# Patient Record
Sex: FEMALE | Race: WHITE | Employment: UNEMPLOYED | URBAN - METROPOLITAN AREA
[De-identification: names, ages, dates, MRNs, and addresses within clinical notes are randomized per-mention and may not be internally consistent; named-entity substitution may affect disease eponyms.]

---

## 2017-01-06 ENCOUNTER — HOSPITAL ENCOUNTER (OUTPATIENT)
Dept: PHYSICAL THERAPY | Age: 52
End: 2017-01-06
Attending: NURSE PRACTITIONER
Payer: COMMERCIAL

## 2017-01-13 ENCOUNTER — HOSPITAL ENCOUNTER (OUTPATIENT)
Dept: PHYSICAL THERAPY | Age: 52
Discharge: HOME | End: 2017-01-13
Attending: NURSE PRACTITIONER
Payer: COMMERCIAL

## 2017-01-13 DIAGNOSIS — N30.10 INTERSTITIAL CYSTITIS: ICD-10-CM

## 2017-01-13 DIAGNOSIS — R35.0 URINARY FREQUENCY: ICD-10-CM

## 2017-01-13 PROCEDURE — 97162 PT EVAL MOD COMPLEX 30 MIN: CPT

## 2017-01-13 NOTE — PROGRESS NOTES
Erin Jha  : 1965 [facility] at [address], [address] [place] [address],[address], [facility] [address]  [phone]   [phone]          OUTPATIENT PHYSICAL THERAPY:Initial Assessment 2017    ICD-10: Treatment Diagnosis: Myalgia (M79.1)  Precautions/Allergies:   Adhesive tape-silicones; Codeine; and Percocet [oxycodone-acetaminophen]   Fall Risk Score: 0 (? 5 = High Risk)  MD Orders: Evaluate and treat MEDICAL/REFERRING DIAGNOSIS:  Interstitial cystitis [N30.10]  Urinary frequency [R35.0]   DATE OF ONSET:   REFERRING PHYSICIAN: Ga Pierre, *  RETURN PHYSICIAN APPOINTMENT: unknown     INITIAL ASSESSMENT:  Ms. Yady Hassan presents with pelvic pain and urinary frequency, both correlating with her dx of IC. She presents with significant myofascial restrictions around pelvis/hips/perineum, most of which refer to right lower quadrant. She was educated on bladder irritants as she has already noticed certain foods have a negative impact on her IC. She was also educated on the detrimental effects of limiting fluid intake on her bladder health. Pt shows good rehab potential working towards reducing bladder irritants, decreasing myofascial tightness in surrounding structures, and reducing resting tone of PFmm (to be assessed next visit but pt's symptoms and subjective intake point to a possible contribution). Pt will benefit from physical therapy to address stated problems. Plan of care was discussed and agreed upon with patient and HEP was initiated. Thank you for the opportunity to work with this patient. PROBLEM LIST (Impacting functional limitations):  1. Decreased Strength  2. Increased Pain  3. Decreased Activity Tolerance  4. Decreased Flexibility/Joint Mobility  5. Decreased Knowledge of Precautions INTERVENTIONS PLANNED:  1. Neuromuscular re-education  2. Biofeedback as needed  3. HEP  4. Bladder retraining  5. Bladder education  6.  Electrical Stimulation  7. Manual Therapy  8. Therapeutic Activites  9. Therapeutic Exercise/Strengthening  10. Ultrasound (US)   TREATMENT PLAN:  Effective Dates: 1/13/17 TO 4/7/17. Frequency/Duration: 1 time a week for 12 weeks  GOALS: (Goals have been discussed and agreed upon with patient.)  Short-Term Functional Goals: Time Frame: 2 weeks  1. Patient will demonstrate independence with home exercise program.  2. Pt will report increase in fluid intake to 8cups/day for improved bladder health  Discharge Goals: Time Frame: 12 weeks  1. Pt will score 19% on PFI7 for overall functional improvement. 2. Pt will report improvement in pain levels to 2/10 on avg to allow for sleeping throughout the night without waking secondary to pain. 3. Pt will report decrease in urinary frequency to every other hour for decreased social anxiety. Rehabilitation Potential For Stated Goals: Good  Regarding [facility] therapy, I certify that the treatment plan above will be carried out by a therapist or under their direction. Thank you for this referral,  Maribell Balderas, PT     Referring Physician Signature: Wandy Avila, *              Date                    HISTORY:   Present Symptoms:    01/13/17   See below  History of Present Injury/Illness (Reason for Referral):  Pt states pain started in 2011 as a bad UTI. This improved but pain/frequency returned and has not gone away since. She states her dtr is in high school and is going to school online. Sitting a long time bothers. She gets sore at incision site of hysterectomy. Hiking or any activity away from bathroom is not possible at this point. Occasionally lying down does help symptoms. She states the amitrypteline and states it helps some. Past Medical History/Comorbidities:   Ms. Starr Smalls  has a past medical history of Anxiety; Arthritis; Autoimmune disease ([facility] [address] Chronic pain; GERD (gastroesophageal reflux disease); Hypertension; Interstitial cystitis;  Left knee pain; Meniscus tear; Morbid obesity ([address] Other ill-defined conditions(799.89); Other malaise and fatigue; Ovarian cyst; Panic attacks; Seizures ([address] Seizures ([address] Unspecified adverse effect of anesthesia (2014); and Urinary frequency (7/9/2015). She also has no past medical history of Aneurysm ([facility] [address] Arrhythmia; Asthma; CAD (coronary artery disease); Cancer ([address] Chronic kidney disease; Chronic obstructive pulmonary disease ([address] Coagulation disorder ([address] Diabetes ([address] Difficult intubation; Heart failure ([address] Liver disease; Malignant hyperthermia due to anesthesia; Nausea & vomiting; Pseudocholinesterase deficiency; PUD (peptic ulcer disease); Stroke [facility]); Thromboembolus ([facility] [address] Thyroid disease; or Unspecified sleep apnea. Ms. Yady Hassan  has a past surgical history that includes cholecystectomy (1994); gyn (1982); hysterectomy (2014); knee arthroscopy; and cardiac surg procedure unlist.  Social History/Living Environment:     lives w dtr and  and going to school online  Prior Level of Function/Work/Activity:  Not currently working  Personal Factors:    Occupation:  Stress of going back to school changing careers  Current Medications:    Current Outpatient Prescriptions:     estradiol (ESTRACE) 2 mg tablet, Take 1 Tab by mouth nightly., Disp: 30 Tab, Rfl: 9    metFORMIN (GLUCOPHAGE) 500 mg tablet, Take 1 Tab by mouth daily (with breakfast). , Disp: 90 Tab, Rfl: 2    ciprofloxacin HCl (CIPRO) 500 mg tablet, Take 1 Tab by mouth two (2) times a day., Disp: 10 Tab, Rfl: 0    amitriptyline (ELAVIL) 25 mg tablet, Take 1 Tab by mouth nightly., Disp: 30 Tab, Rfl: 2    Mth-Me Blue-Sod Phos-PhSal-Hyo (URIBEL) 118-10-40.8-36 mg cap capsule, Take 1 Cap by mouth four (4) times daily. , Disp: 40 Cap, Rfl: 1    estradiol (ESTRACE) 0.01 % (0.1 mg/gram) vaginal cream, Insert 1 g into vagina three (3) days a week., Disp: , Rfl:     multivitamin (ONE A DAY) tablet, Take 1 Tab by mouth daily. , Disp: , Rfl:    losartan (COZAAR) 100 mg tablet, Take 1 Tab by mouth nightly. Indications: HYPERTENSION, Disp: 30 Tab, Rfl: 11    mometasone (NASONEX) 50 mcg/actuation nasal spray, 2 Sprays by Both Nostrils route daily. , Disp: 1 Container, Rfl: 11    zolpidem (AMBIEN) 10 mg tablet, Take  by mouth nightly as needed for Sleep., Disp: , Rfl:     Cetirizine (ZYRTEC) 10 mg cap, Take  by mouth., Disp: , Rfl:     estradiol (VAGIFEM) 10 mcg tab vaginal tablet, Insert 10 mcg into vagina two (2) times a week., Disp: , Rfl:     Cholecalciferol, Vitamin D3, (VITAMIN D3) 1,000 unit cap, Take  by mouth daily. , Disp: , Rfl:     venlafaxine (EFFEXOR) 100 mg tablet, Take 100 mg by mouth two (2) times a day. Indications: GENERALIZED ANXIETY DISORDER, Disp: , Rfl:    Date Last Reviewed:  01/13/17   Gynecological History:   · Number of pregnancies: 0, vaginal 0, C-sections 0  · Weight gain: na  · Episiotomy: na  Past Urinary Medical History:    · History of UTI, Menopause: frequent UTI's since 2011,  Prior to hysterectomy  · Previous Treatments: antibiotics  Incontinence History:  PROBLEM: YES/NO: COMMENTS:   Loss of urine with coughing YES    Loss of urine with lifting  NO    Loss of urine with exercise, running NO    Loss of urine with strong urge NO    Loss of urine with approaching the bathroom NO    Loss of urine with key in lock NO    Loss of urine as getting to toilet/removing clothing NO    Loss of urine when hearing running water NO    Have difficulty initiating a urine stream NO 'not really. Maybe a second or 2'   Have difficulty stopping urine stream NO    Have to strain to empty bladder YES    Dribble urine when urinating NO    Dribble urine after emptying bladder NO    Experience pain with urination YES    Experience burning during urination YES    Have blood in urine NO      Voiding Patterns:  Patient voids every 1 hours during the day and 2-3 times during the night. Patient reports that she empties bladder fully.   Patient uses pads for bladder protection; she changes pads 1 times per day. Fluid Intake:  Patient drinks 5 cups of fluid per day. She consumes 2 cups of caffeinated beverages per day. Patient does limit fluid intake to control bladder. Bowel Habits:  Patient demonstrates normal bowel habits. Mobility / Self Care: independent  Personal / Social History:  · Sexually active? NO:   · Social activities restricted due to urinary incontinence? NO:       Number of Personal Factors/Comorbidities that affect the Plan of Care: 1-2: MODERATE COMPLEXITY   EXAMINATION:   External Observation:   · Voluntary Contraction: present  · Voluntary Relaxation: delayed  · Involuntary Contraction: present  · Involuntary Relaxation: delayed  · Perineal Body Assessment: slightly elevated  · Reflex:  NT  · Anal Oakley: NT  · Skin Integrity: normal  · Vaginal Vault Size: within normal limits    [place] PERFECT (Power/Endurnace/Repetitions/Fast Twitch/Elevation/Co-contraction/Timing) Scale:   · [place] PERFECT = 3/5///  · Palpation:   Right Left   Bulbocavernosus Tender w wincing Tender w wincing   Ischocavernosus     Superficial Transverse Perineal tender tender   Sphincter Urethrae     Compressor Urethra      Urethra-vaginalis     Deep Transverse Perineium tender tender   Obturator Internus Tender w wincing Tender w wincing   Coccygeus Tender w wincing Tender w wincing   Levator Ani tender tender   Adductor tender tender   Psoas tender tender ·   Myofascial restrictions noted abdominals, iliopsoas with referrals to right lower quadrant  · Significant ttp right iliacus around iliac crest and ASIS as well as bilat psoas to umbilicus level. Body Structures Involved:  1. Nerves  2. Digestive Structures  3. Muscles Body Functions Affected:  1. Mental  2. Sensory/Pain  3. Genitourinary  4. Reproductive  5. Neuromusculoskeletal  6. Digestive Activities and Participation Affected:  1. General Tasks and Demands  2. Mobility  3. Self Care  4.  Interpersonal Interactions and Relationships   Number of elements that affect the Plan of Care: 3: MODERATE COMPLEXITY   CLINICAL PRESENTATION:   Presentation: Evolving clinical presentation with changing clinical characteristics: MODERATE COMPLEXITY   CLINICAL DECISION MAKING:   Outcome Measure: Tool Used: Pelvic Floor Impact Questionnaireshort form 7 (PFIQ-7)   Score:  Initial:   · Bladder or Urine: 52.38  · Bowel or Rectum: 0  · Vagina or Pelvis: 52.38 Most Recent: X (Date: -- )  · Bladder or Urine: X  · Bowel or Rectum: X  · Vagina or Pelvis: X   Interpretation of Score: Each of the 7 sections is scored on a scale from 0-3; 0 representing \"Not at all\", 3 representing \"Quite a bit\". The mean value is taken from all the answered items, then multiplied by 100 to obtain the scale score, ranging from 0-100. This process is repeated for each column representing bowel, bladder, and pelvic pain. ? Self Care:     - CURRENT STATUS: CJ - 20%-39% impaired, limited or restricted    - GOAL STATUS: CI - 1%-19% impaired, limited or restricted    - D/C STATUS:  ---------------To be determined---------------     Medical Necessity:   · Patient demonstrates good rehab potential due to higher previous functional level. Reason for Services/Other Comments:  · Patient continues to require skilled intervention due to ongoing goals noted above. Use of outcome tool(s) and clinical judgement create a POC that gives a: Questionable prediction of patient's progress: MODERATE COMPLEXITY   TREATMENT:   (In addition to Assessment/Re-Assessment sessions the following treatments were rendered)  THERAPEUTIC EXERCISE: (  minutes):  Exercises per grid below to improve strength and coordination. Required minimal verbal and tactile cues to promote proper body mechanics and promote proper body breathing techniques. Progressed resistance and repetitions as indicated.     Date:  1/13/17 Date:   Date:     Activity/Exercise Parameters Parameters Parameters   Diaphragmatic breathing practiced     DKC/perineum stretch 45sec x2     Piriformis stretch 45sec x2     Adductor stretch 45sec x2                          Exercises:  Patient instructed in pelvic floor exercises listed below:  HEP:  All ex's in grid. Sheet issued  The following educational topics were reviewed with patient:  Bladder health, tips to control urge, bladder diary, pelvic floor anatomy, how foods affect bladder, bladder retraining. Treatment/Session Assessment:  Pt reported good understanding of plan of care as well as exercises. All questions were answered and pt was invited to call with any further questions or issues . · Pain/ Symptoms: Initial:   5/10 Post Session:  5/10 ·   Compliance with Program/Exercises: Will assess as treatment progresses. · Recommendations/Intent for next treatment session: \"Next visit will focus on advancements to more challenging activities\".   Total Treatment Duration:  PT Patient Time In/Time Out  Time In: 1030  Time Out: Leann [address] Kristen [address]

## 2017-01-13 NOTE — PROGRESS NOTES
Ambulatory/Rehab Services H2 Model Falls Risk Assessment    Risk Factor Pts. ·   Confusion/Disorientation/Impulsivity  []    4 ·   Symptomatic Depression  []   2 ·   Altered Elimination  []   1 ·   Dizziness/Vertigo  []   1 ·   Gender (Male)  []   1 ·   Any administered antiepileptics (anticonvulsants):  []   2 ·   Any administered benzodiazepines:  []   1 ·   Visual Impairment (specify):  []   1 ·   Portable Oxygen Use  []   1 ·   Orthostatic ? BP  []   1 ·   History of Recent Falls (within 3 mos.)  []   5     Ability to Rise from Chair (choose one) Pts. ·   Ability to rise in a single movement  [x]   0 ·   Pushes up, successful in one attempt  []   1 ·   Multiple attempts, but successful  []   3 ·   Unable to rise without assistance  []   4   Total: (5 or greater = High Risk) 0     Falls Prevention Plan:   []                Physical Limitations to Exercise (specify):   []                Mobility Assistance Device (type):   []                Exercise/Equipment Adaptation (specify):    ©2010 [facility] of [address] Patent #[id number].  Federal Law prohibits the replication, distribution or use without written permission from [facility] [employer]

## 2017-01-20 ENCOUNTER — HOSPITAL ENCOUNTER (OUTPATIENT)
Dept: PHYSICAL THERAPY | Age: 52
Discharge: HOME | End: 2017-01-20
Attending: NURSE PRACTITIONER
Payer: COMMERCIAL

## 2017-01-20 PROCEDURE — 97110 THERAPEUTIC EXERCISES: CPT

## 2017-01-20 PROCEDURE — 97140 MANUAL THERAPY 1/> REGIONS: CPT

## 2017-01-20 NOTE — PROGRESS NOTES
Librado Dodson  : 1965 [facility]  [address], [address] [address],[address], [address] [facility]  [phone]   [phone]          OUTPATIENT PHYSICAL THERAPY:Daily Note 2017    ICD-10: Treatment Diagnosis: Myalgia (M79.1)  Precautions/Allergies:   Adhesive tape-silicones; Codeine; and Percocet [oxycodone-acetaminophen]   Fall Risk Score: 0 (? 5 = High Risk)  MD Orders: Evaluate and treat MEDICAL/REFERRING DIAGNOSIS:  Interstitial cystitis (chronic) without hematuria [N30.10]  Frequency of micturition [R35.0]   DATE OF ONSET:   REFERRING PHYSICIAN: Reagan Paula, Rosalene Saint, *  RETURN PHYSICIAN APPOINTMENT: unknown     INITIAL ASSESSMENT:  Ms. Himanshu Smith presents with pelvic pain and urinary frequency, both correlating with her dx of IC. She presents with significant myofascial restrictions around pelvis/hips/perineum, most of which refer to right lower quadrant. She was educated on bladder irritants as she has already noticed certain foods have a negative impact on her IC. She was also educated on the detrimental effects of limiting fluid intake on her bladder health. Pt shows good rehab potential working towards reducing bladder irritants, decreasing myofascial tightness in surrounding structures, and reducing resting tone of PFmm (to be assessed next visit but pt's symptoms and subjective intake point to a possible contribution). Pt will benefit from physical therapy to address stated problems. Plan of care was discussed and agreed upon with patient and HEP was initiated. Thank you for the opportunity to work with this patient. PROBLEM LIST (Impacting functional limitations):  1. Decreased Strength  2. Increased Pain  3. Decreased Activity Tolerance  4. Decreased Flexibility/Joint Mobility  5. Decreased Knowledge of Precautions INTERVENTIONS PLANNED:  1. Neuromuscular re-education  2. Biofeedback as needed  3. HEP  4. Bladder retraining  5.  Bladder education  6. Electrical Stimulation  7. Manual Therapy  8. Therapeutic Activites  9. Therapeutic Exercise/Strengthening  10. Ultrasound (US)   TREATMENT PLAN:  Effective Dates: 1/13/17 TO 4/7/17. Frequency/Duration: 1 time a week for 12 weeks  GOALS: (Goals have been discussed and agreed upon with patient.)  Short-Term Functional Goals: Time Frame: 2 weeks  1. Patient will demonstrate independence with home exercise program.  2. Pt will report increase in fluid intake to 8cups/day for improved bladder health  Discharge Goals: Time Frame: 12 weeks  1. Pt will score 19% on PFI7 for overall functional improvement. 2. Pt will report improvement in pain levels to 2/10 on avg to allow for sleeping throughout the night without waking secondary to pain. 3. Pt will report decrease in urinary frequency to every other hour for decreased social anxiety. Rehabilitation Potential For Stated Goals: Good  Regarding Aminah Menendez therapy, I certify that the treatment plan above will be carried out by a therapist or under their direction. Thank you for this referral,  Marybel Christianson, PT     Referring Physician Signature: Tee Scales, *              Date                    HISTORY:   Present Symptoms:  Pain Intensity 1: 6 01/20/17   Pt states she has had a rough week with dog dying and has not performed HEP. She feels like she is getting a UTI but states half the time she thinks she has one the test comes out negative. History of Present Injury/Illness (Reason for Referral):  Pt states pain started in 2011 as a bad UTI. This improved but pain/frequency returned and has not gone away since. She states her dtr is in high school and is going to school online. Sitting a long time bothers. She gets sore at incision site of hysterectomy. Hiking or any activity away from bathroom is not possible at this point. Occasionally lying down does help symptoms. She states the amitrypteline and states it helps some. Past Medical History/Comorbidities:   Ms. Celestina Bliss  has a past medical history of Anxiety; Arthritis; Autoimmune disease ([address] Chronic pain; GERD (gastroesophageal reflux disease); Hypertension; Interstitial cystitis; Left knee pain; Meniscus tear; Morbid obesity ([address] Other ill-defined conditions(799.89); Other malaise and fatigue; Ovarian cyst; Panic attacks; Seizures ([address] Seizures ([address] Unspecified adverse effect of anesthesia (2014); and Urinary frequency (7/9/2015). She also has no past medical history of Aneurysm ([address] Arrhythmia; Asthma; CAD (coronary artery disease); Cancer ([address] Chronic kidney disease; Chronic obstructive pulmonary disease ([address] Coagulation disorder ([address] Diabetes ([address] Difficult intubation; Heart failure ([address] Liver disease; Malignant hyperthermia due to anesthesia; Nausea & vomiting; Pseudocholinesterase deficiency; PUD (peptic ulcer disease); Stroke [facility]); Thromboembolus ([address] Thyroid disease; or Unspecified sleep apnea. Ms. Celestina Bliss  has a past surgical history that includes cholecystectomy (1994); gyn (1982); hysterectomy (2014); knee arthroscopy; and cardiac surg procedure unlist.  Social History/Living Environment:     lives w dtr and  and going to school online  Prior Level of Function/Work/Activity:  Not currently working  Personal Factors:    Occupation:  Stress of going back to school changing careers  Current Medications:    Current Outpatient Prescriptions:     estradiol (ESTRACE) 2 mg tablet, Take 1 Tab by mouth nightly., Disp: 30 Tab, Rfl: 9    metFORMIN (GLUCOPHAGE) 500 mg tablet, Take 1 Tab by mouth daily (with breakfast). , Disp: 90 Tab, Rfl: 2    ciprofloxacin HCl (CIPRO) 500 mg tablet, Take 1 Tab by mouth two (2) times a day., Disp: 10 Tab, Rfl: 0    amitriptyline (ELAVIL) 25 mg tablet, Take 1 Tab by mouth nightly., Disp: 30 Tab, Rfl: 2    [facility] Blue-Sod Phos-PhSal-Hyo (URIBEL) 118-10-40.8-36 mg cap capsule, Take 1 Cap by mouth four (4) times daily. , Disp: 40 Cap, Rfl: 1    estradiol (ESTRACE) 0.01 % (0.1 mg/gram) vaginal cream, Insert 1 g into vagina three (3) days a week., Disp: , Rfl:     multivitamin (ONE A DAY) tablet, Take 1 Tab by mouth daily. , Disp: , Rfl:     losartan (COZAAR) 100 mg tablet, Take 1 Tab by mouth nightly. Indications: HYPERTENSION, Disp: 30 Tab, Rfl: 11    mometasone (NASONEX) 50 mcg/actuation nasal spray, 2 Sprays by Both Nostrils route daily. , Disp: 1 Container, Rfl: 11    zolpidem (AMBIEN) 10 mg tablet, Take  by mouth nightly as needed for Sleep., Disp: , Rfl:     Cetirizine (ZYRTEC) 10 mg cap, Take  by mouth., Disp: , Rfl:     estradiol (VAGIFEM) 10 mcg tab vaginal tablet, Insert 10 mcg into vagina two (2) times a week., Disp: , Rfl:     Cholecalciferol, Vitamin D3, (VITAMIN D3) 1,000 unit cap, Take  by mouth daily. , Disp: , Rfl:     venlafaxine (EFFEXOR) 100 mg tablet, Take 100 mg by mouth two (2) times a day. Indications: GENERALIZED ANXIETY DISORDER, Disp: , Rfl:    Date Last Reviewed:  01/20/17   Gynecological History:   · Number of pregnancies: 0, vaginal 0, C-sections 0  · Weight gain: na  · Episiotomy: na  Past Urinary Medical History:    · History of UTI, Menopause: frequent UTI's since 2011,  Prior to hysterectomy  · Previous Treatments: antibiotics  Incontinence History:  PROBLEM: YES/NO: COMMENTS:   Loss of urine with coughing YES    Loss of urine with lifting  NO    Loss of urine with exercise, running NO    Loss of urine with strong urge NO    Loss of urine with approaching the bathroom NO    Loss of urine with key in lock NO    Loss of urine as getting to toilet/removing clothing NO    Loss of urine when hearing running water NO    Have difficulty initiating a urine stream NO 'not really.   Maybe a second or 2'   Have difficulty stopping urine stream NO    Have to strain to empty bladder YES    Dribble urine when urinating NO    Dribble urine after emptying bladder NO    Experience pain with urination YES Experience burning during urination YES    Have blood in urine NO      Voiding Patterns:  Patient voids every 1 hours during the day and 2-3 times during the night. Patient reports that she empties bladder fully. Patient uses pads for bladder protection; she changes pads 1 times per day. Fluid Intake:  Patient drinks 5 cups of fluid per day. She consumes 2 cups of caffeinated beverages per day. Patient does limit fluid intake to control bladder. Bowel Habits:  Patient demonstrates normal bowel habits. Mobility / Self Care: independent  Personal / Social History:  · Sexually active? NO:   · Social activities restricted due to urinary incontinence? NO:       Number of Personal Factors/Comorbidities that affect the Plan of Care: 1-2: MODERATE COMPLEXITY   EXAMINATION:   External Observation:   · Voluntary Contraction: present  · Voluntary Relaxation: delayed  · Involuntary Contraction: present  · Involuntary Relaxation: delayed  · Perineal Body Assessment: slightly elevated  · Reflex:  NT  · Anal [place]: NT  · Skin Integrity: normal  · Vaginal Vault Size: within normal limits    [place] PERFECT (Power/Endurnace/Repetitions/Fast Twitch/Elevation/Co-contraction/Timing) Scale:   · [place] PERFECT = 3/5///  · Palpation:   Right Left   Bulbocavernosus Tender w wincing Tender w wincing   Ischocavernosus     Superficial Transverse Perineal tender tender   Sphincter Urethrae     Compressor Urethra      Urethra-vaginalis     Deep Transverse Perineium tender tender   Obturator Internus Tender w wincing Tender w wincing   Coccygeus Tender w wincing Tender w wincing   Levator Ani tender tender   Adductor tender tender   Psoas tender tender ·   Myofascial restrictions noted abdominals, iliopsoas with referrals to right lower quadrant  · Significant ttp right iliacus around iliac crest and ASIS as well as bilat psoas to umbilicus level. Body Structures Involved:  1. Nerves  2. Digestive Structures  3.  Muscles Body Functions Affected:  1. Mental  2. Sensory/Pain  3. Genitourinary  4. Reproductive  5. Neuromusculoskeletal  6. Digestive Activities and Participation Affected:  1. General Tasks and Demands  2. Mobility  3. Self Care  4. Interpersonal Interactions and Relationships   Number of elements that affect the Plan of Care: 3: MODERATE COMPLEXITY   CLINICAL PRESENTATION:   Presentation: Evolving clinical presentation with changing clinical characteristics: MODERATE COMPLEXITY   CLINICAL DECISION MAKING:   Outcome Measure: Tool Used: Pelvic Floor Impact Questionnaireshort form 7 (PFIQ-7)   Score:  Initial:   · Bladder or Urine: 52.38  · Bowel or Rectum: 0  · Vagina or Pelvis: 52.38 Most Recent: X (Date: -- )  · Bladder or Urine: X  · Bowel or Rectum: X  · Vagina or Pelvis: X   Interpretation of Score: Each of the 7 sections is scored on a scale from 0-3; 0 representing \"Not at all\", 3 representing \"Quite a bit\". The mean value is taken from all the answered items, then multiplied by 100 to obtain the scale score, ranging from 0-100. This process is repeated for each column representing bowel, bladder, and pelvic pain. ? Self Care:     - CURRENT STATUS: CJ - 20%-39% impaired, limited or restricted    - GOAL STATUS: CI - 1%-19% impaired, limited or restricted    - D/C STATUS:  ---------------To be determined---------------     Medical Necessity:   · Patient demonstrates good rehab potential due to higher previous functional level. Reason for Services/Other Comments:  · Patient continues to require skilled intervention due to ongoing goals noted above. Use of outcome tool(s) and clinical judgement create a POC that gives a: Questionable prediction of patient's progress: MODERATE COMPLEXITY   TREATMENT:   (In addition to Assessment/Re-Assessment sessions the following treatments were rendered)  THERAPEUTIC EXERCISE: ( 25 minutes):  Exercises per grid below to improve strength and coordination.   Required minimal verbal and tactile cues to promote proper body mechanics and promote proper body breathing techniques. Progressed resistance and repetitions as indicated. Date:  1/13/17 Date:  1/20/18 Date:     Activity/Exercise Parameters Parameters Parameters   Diaphragmatic breathing practiced     DKC/perineum stretch 45sec x2 reviewed    Piriformis stretch 45sec x2 reviewed    Adductor stretch 45sec x2 reviewed    Review of bladder health, reduction of irritants  10'    King stretch  45sec x2             Exercises:  Patient instructed in pelvic floor exercises listed below:  HEP:  All ex's in grid. Sheet issued    Manual Therapy:  (35')  SCS psoas, iliacus, adductors, manual stretching adductors, king str, piriformis. MFR abdominals  The following educational topics were reviewed with patient:  Bladder health, tips to control urge, bladder diary, pelvic floor anatomy, how foods affect bladder, bladder retraining. Treatment/Session Assessment:  Tolerated treatment well voicing improvement in symptoms end of treatment. Encouraged pt to slowly increase water intake as well as decrease bladder irritants for improvement in bladder health to see if helps her current UTI symptoms. Pt's prior hx is 50/50 actually having a UTI when testing since CI sx. · Pain/ Symptoms: Initial:   6/10 Post Session:  2-3/10 ·   Compliance with Program/Exercises: Will assess as treatment progresses. · Recommendations/Intent for next treatment session: \"Next visit will focus on advancements to more challenging activities\".   Total Treatment Duration:  PT Patient Time In/Time Out  Time In: 1030  Time Out: Leann [address]

## 2017-01-27 ENCOUNTER — HOSPITAL ENCOUNTER (OUTPATIENT)
Dept: PHYSICAL THERAPY | Age: 52
Discharge: HOME | End: 2017-01-27
Attending: NURSE PRACTITIONER
Payer: COMMERCIAL

## 2017-01-27 PROCEDURE — 97110 THERAPEUTIC EXERCISES: CPT

## 2017-01-27 PROCEDURE — 97140 MANUAL THERAPY 1/> REGIONS: CPT

## 2017-01-27 NOTE — PROGRESS NOTES
Janiya Flowers  : 1965 [facility] [place] [address], 1976 [facility]  [phone]   [phone]          OUTPATIENT PHYSICAL THERAPY:Daily Note 2017    ICD-10: Treatment Diagnosis: Myalgia (M79.1)  Precautions/Allergies:   Adhesive tape-silicones; Codeine; and Percocet [oxycodone-acetaminophen]   Fall Risk Score: 0 (? 5 = High Risk)  MD Orders: Evaluate and treat MEDICAL/REFERRING DIAGNOSIS:  Interstitial cystitis (chronic) without hematuria [N30.10]  Frequency of micturition [R35.0]   DATE OF ONSET:   REFERRING PHYSICIAN: Jesus Lovell, *  RETURN PHYSICIAN APPOINTMENT: unknown     INITIAL ASSESSMENT:  Ms. Ngozi Ospina presents with pelvic pain and urinary frequency, both correlating with her dx of IC. She presents with significant myofascial restrictions around pelvis/hips/perineum, most of which refer to right lower quadrant. She was educated on bladder irritants as she has already noticed certain foods have a negative impact on her IC. She was also educated on the detrimental effects of limiting fluid intake on her bladder health. Pt shows good rehab potential working towards reducing bladder irritants, decreasing myofascial tightness in surrounding structures, and reducing resting tone of PFmm (to be assessed next visit but pt's symptoms and subjective intake point to a possible contribution). Pt will benefit from physical therapy to address stated problems. Plan of care was discussed and agreed upon with patient and HEP was initiated. Thank you for the opportunity to work with this patient. PROBLEM LIST (Impacting functional limitations):  1. Decreased Strength  2. Increased Pain  3. Decreased Activity Tolerance  4. Decreased Flexibility/Joint Mobility  5. Decreased Knowledge of Precautions INTERVENTIONS PLANNED:  1. Neuromuscular re-education  2. Biofeedback as needed  3. HEP  4. Bladder retraining  5.  Bladder education  6. Electrical Stimulation  7. Manual Therapy  8. Therapeutic Activites  9. Therapeutic Exercise/Strengthening  10. Ultrasound (US)   TREATMENT PLAN:  Effective Dates: 1/13/17 TO 4/7/17. Frequency/Duration: 1 time a week for 12 weeks  GOALS: (Goals have been discussed and agreed upon with patient.)  Short-Term Functional Goals: Time Frame: 2 weeks  1. Patient will demonstrate independence with home exercise program.  2. Pt will report increase in fluid intake to 8cups/day for improved bladder health  Discharge Goals: Time Frame: 12 weeks  1. Pt will score 19% on PFI7 for overall functional improvement. 2. Pt will report improvement in pain levels to 2/10 on avg to allow for sleeping throughout the night without waking secondary to pain. 3. Pt will report decrease in urinary frequency to every other hour for decreased social anxiety. Rehabilitation Potential For Stated Goals: Good  Regarding Suann Masood therapy, I certify that the treatment plan above will be carried out by a therapist or under their direction. Thank you for this referral,  Ash Nunn, PT     Referring Physician Signature: Freda Lopez, *              Date                    HISTORY:   Present Symptoms:  Pain Intensity 1: 4 01/27/17   Pt reports continued stressors in her life. Feels the stretches do help her symptoms. She did not get tested for UTI because her symptoms have fluctuated and she states the fluctuation directly correlates with bladder irritating foods/drinks discussed in last visit. She states these symptoms have improved since last visit but have not completely resolved. History of Present Injury/Illness (Reason for Referral):  Pt states pain started in 2011 as a bad UTI. This improved but pain/frequency returned and has not gone away since. She states her dtr is in high school and is going to school online. Sitting a long time bothers. She gets sore at incision site of hysterectomy.   Hiking or any activity away from bathroom is not possible at this point. Occasionally lying down does help symptoms. She states the amitrypteline and states it helps some. Past Medical History/Comorbidities:   Ms. Yady Hassan  has a past medical history of Anxiety; Arthritis; Autoimmune disease ([address] Chronic pain; GERD (gastroesophageal reflux disease); Hypertension; Interstitial cystitis; Left knee pain; Meniscus tear; Morbid obesity ([address] Other ill-defined conditions(799.89); Other malaise and fatigue; Ovarian cyst; Panic attacks; Seizures ([address] Seizures ([address] Unspecified adverse effect of anesthesia (2014); and Urinary frequency (7/9/2015). She also has no past medical history of Aneurysm ([address] Arrhythmia; Asthma; CAD (coronary artery disease); Cancer ([address] Chronic kidney disease; Chronic obstructive pulmonary disease ([address] Coagulation disorder ([address] Diabetes ([address] Difficult intubation; Heart failure ([address] Liver disease; Malignant hyperthermia due to anesthesia; Nausea & vomiting; Pseudocholinesterase deficiency; PUD (peptic ulcer disease); Stroke [facility]); Thromboembolus ([address] Thyroid disease; or Unspecified sleep apnea. Ms. Yady Hassan  has a past surgical history that includes cholecystectomy (1994); gyn (1982); hysterectomy (2014); knee arthroscopy; and cardiac surg procedure unlist.  Social History/Living Environment:     lives w dtr and  and going to school online  Prior Level of Function/Work/Activity:  Not currently working  Personal Factors:    Occupation:  Stress of going back to school changing careers  Current Medications:    Current Outpatient Prescriptions:     estradiol (ESTRACE) 2 mg tablet, Take 1 Tab by mouth nightly., Disp: 30 Tab, Rfl: 9    metFORMIN (GLUCOPHAGE) 500 mg tablet, Take 1 Tab by mouth daily (with breakfast). , Disp: 90 Tab, Rfl: 2    ciprofloxacin HCl (CIPRO) 500 mg tablet, Take 1 Tab by mouth two (2) times a day., Disp: 10 Tab, Rfl: 0    amitriptyline (ELAVIL) 25 mg tablet, Take 1 Tab by mouth nightly., Disp: 30 Tab, Rfl: 2    Mth-Me Blue-Sod Phos-PhSal-Hyo (URIBEL) 118-10-40.8-36 mg cap capsule, Take 1 Cap by mouth four (4) times daily. , Disp: 40 Cap, Rfl: 1    estradiol (ESTRACE) 0.01 % (0.1 mg/gram) vaginal cream, Insert 1 g into vagina three (3) days a week., Disp: , Rfl:     multivitamin (ONE A DAY) tablet, Take 1 Tab by mouth daily. , Disp: , Rfl:     losartan (COZAAR) 100 mg tablet, Take 1 Tab by mouth nightly. Indications: HYPERTENSION, Disp: 30 Tab, Rfl: 11    mometasone (NASONEX) 50 mcg/actuation nasal spray, 2 Sprays by Both Nostrils route daily. , Disp: 1 Container, Rfl: 11    zolpidem (AMBIEN) 10 mg tablet, Take  by mouth nightly as needed for Sleep., Disp: , Rfl:     Cetirizine (ZYRTEC) 10 mg cap, Take  by mouth., Disp: , Rfl:     estradiol (VAGIFEM) 10 mcg tab vaginal tablet, Insert 10 mcg into vagina two (2) times a week., Disp: , Rfl:     Cholecalciferol, Vitamin D3, (VITAMIN D3) 1,000 unit cap, Take  by mouth daily. , Disp: , Rfl:     venlafaxine (EFFEXOR) 100 mg tablet, Take 100 mg by mouth two (2) times a day. Indications: GENERALIZED ANXIETY DISORDER, Disp: , Rfl:    Date Last Reviewed:  01/27/17   Gynecological History:   · Number of pregnancies: 0, vaginal 0, C-sections 0  · Weight gain: na  · Episiotomy: na  Past Urinary Medical History:    · History of UTI, Menopause: frequent UTI's since 2011,  Prior to hysterectomy  · Previous Treatments: antibiotics  Incontinence History:  PROBLEM: YES/NO: COMMENTS:   Loss of urine with coughing YES    Loss of urine with lifting  NO    Loss of urine with exercise, running NO    Loss of urine with strong urge NO    Loss of urine with approaching the bathroom NO    Loss of urine with key in lock NO    Loss of urine as getting to toilet/removing clothing NO    Loss of urine when hearing running water NO    Have difficulty initiating a urine stream NO 'not really.   Maybe a second or 2'   Have difficulty stopping urine stream NO    Have to strain to empty bladder YES    Dribble urine when urinating NO    Dribble urine after emptying bladder NO    Experience pain with urination YES    Experience burning during urination YES    Have blood in urine NO      Voiding Patterns:  Patient voids every 1 hours during the day and 2-3 times during the night. Patient reports that she empties bladder fully. Patient uses pads for bladder protection; she changes pads 1 times per day. Fluid Intake:  Patient drinks 5 cups of fluid per day. She consumes 2 cups of caffeinated beverages per day. Patient does limit fluid intake to control bladder. Bowel Habits:  Patient demonstrates normal bowel habits. Mobility / Self Care: independent  Personal / Social History:  · Sexually active? NO:   · Social activities restricted due to urinary incontinence?  NO:       Number of Personal Factors/Comorbidities that affect the Plan of Care: 1-2: MODERATE COMPLEXITY   EXAMINATION:   External Observation:   · Voluntary Contraction: present  · Voluntary Relaxation: delayed  · Involuntary Contraction: present  · Involuntary Relaxation: delayed  · Perineal Body Assessment: slightly elevated  · Reflex:  NT  · Anal Crowley: NT  · Skin Integrity: normal  · Vaginal Vault Size: within normal limits    [place] PERFECT (Power/Endurnace/Repetitions/Fast Twitch/Elevation/Co-contraction/Timing) Scale:   · [place] PERFECT = 3/5///  · Palpation:   Right Left   Bulbocavernosus Tender w wincing Tender w wincing   Ischocavernosus     Superficial Transverse Perineal tender tender   Sphincter Urethrae     Compressor Urethra      Urethra-vaginalis     Deep Transverse Perineium tender tender   Obturator Internus Tender w wincing Tender w wincing   Coccygeus Tender w wincing Tender w wincing   Levator Ani tender tender   Adductor tender tender   Psoas tender tender ·   Myofascial restrictions noted abdominals, iliopsoas with referrals to right lower quadrant  · Significant ttp right iliacus around iliac crest and ASIS as well as bilat psoas to umbilicus level. Body Structures Involved:  1. Nerves  2. Digestive Structures  3. Muscles Body Functions Affected:  1. Mental  2. Sensory/Pain  3. Genitourinary  4. Reproductive  5. Neuromusculoskeletal  6. Digestive Activities and Participation Affected:  1. General Tasks and Demands  2. Mobility  3. Self Care  4. Interpersonal Interactions and Relationships   Number of elements that affect the Plan of Care: 3: MODERATE COMPLEXITY   CLINICAL PRESENTATION:   Presentation: Evolving clinical presentation with changing clinical characteristics: MODERATE COMPLEXITY   CLINICAL DECISION MAKING:   Outcome Measure: Tool Used: Pelvic Floor Impact Questionnaireshort form 7 (PFIQ-7)   Score:  Initial:   · Bladder or Urine: 52.38  · Bowel or Rectum: 0  · Vagina or Pelvis: 52.38 Most Recent: X (Date: -- )  · Bladder or Urine: X  · Bowel or Rectum: X  · Vagina or Pelvis: X   Interpretation of Score: Each of the 7 sections is scored on a scale from 0-3; 0 representing \"Not at all\", 3 representing \"Quite a bit\". The mean value is taken from all the answered items, then multiplied by 100 to obtain the scale score, ranging from 0-100. This process is repeated for each column representing bowel, bladder, and pelvic pain. ? Self Care:     - CURRENT STATUS: CJ - 20%-39% impaired, limited or restricted    - GOAL STATUS: CI - 1%-19% impaired, limited or restricted    - D/C STATUS:  ---------------To be determined---------------     Medical Necessity:   · Patient demonstrates good rehab potential due to higher previous functional level. Reason for Services/Other Comments:  · Patient continues to require skilled intervention due to ongoing goals noted above.    Use of outcome tool(s) and clinical judgement create a POC that gives a: Questionable prediction of patient's progress: MODERATE COMPLEXITY   TREATMENT:   (In addition to Assessment/Re-Assessment sessions the following treatments were rendered)  THERAPEUTIC EXERCISE: ( 30 minutes):  Exercises per grid below to improve strength and coordination. Required minimal verbal and tactile cues to promote proper body mechanics and promote proper body breathing techniques. Progressed resistance and repetitions as indicated. Date:  1/13/17 Date:  1/20/18 Date:  1/27/17   Activity/Exercise Parameters Parameters Parameters   Diaphragmatic breathing practiced     DKC/perineum stretch 45sec x2 reviewed    Piriformis stretch 45sec x2 reviewed reviewed   Adductor stretch 45sec x2 reviewed Standing 45\" x2   Review of bladder health, reduction of irritants  10'    King stretch  45sec x2 45\" x2   Self release sitting on medicine ball   1' x2 and HEP discussion   theracane tpr release   Review for home            Exercises:  Patient instructed in pelvic floor exercises listed below:  HEP:  All ex's in grid. Sheet issued    Manual Therapy:  (30')  SCS psoas, iliacus, adductors, manual stretching adductors, king str, piriformis. MFR abdominals  The following educational topics were reviewed with patient:  Bladder health, tips to control urge, bladder diary, pelvic floor anatomy, how foods affect bladder, bladder retraining. Treatment/Session Assessment: Good response to medicine ball self release and discussion for theracane use at home. Pt owns a theracane and discussed padding a tennis ball with pillow or towel at home to simulate theraball. Emphasized importance of not putting so much pressure with doing self tpr to cause increase in pain or inability to relax with pressure. Initiate internal MT next visit. · Pain/ Symptoms: Initial:   4/10 Post Session:  3/10 ·   Compliance with Program/Exercises: Will assess as treatment progresses. · Recommendations/Intent for next treatment session: \"Next visit will focus on advancements to more challenging activities\".   Total Treatment Duration:  PT Patient Time In/Time Out  Time In: 1030  Time Out: 1130    Cynthia Alvarado PT

## 2017-02-03 ENCOUNTER — APPOINTMENT (OUTPATIENT)
Dept: PHYSICAL THERAPY | Age: 52
End: 2017-02-03
Attending: NURSE PRACTITIONER
Payer: COMMERCIAL

## 2017-02-07 PROBLEM — E11.9 TYPE 2 DIABETES MELLITUS WITHOUT COMPLICATION, WITHOUT LONG-TERM CURRENT USE OF INSULIN (HCC): Status: ACTIVE | Noted: 2017-02-07

## 2017-02-08 ENCOUNTER — APPOINTMENT (OUTPATIENT)
Dept: PHYSICAL THERAPY | Age: 52
End: 2017-02-08
Attending: NURSE PRACTITIONER
Payer: COMMERCIAL

## 2017-02-10 ENCOUNTER — HOSPITAL ENCOUNTER (OUTPATIENT)
Dept: SLEEP MEDICINE | Age: 52
Discharge: HOME | End: 2017-02-10
Payer: COMMERCIAL

## 2017-02-10 PROCEDURE — 95810 POLYSOM 6/> YRS 4/> PARAM: CPT

## 2017-02-15 ENCOUNTER — APPOINTMENT (OUTPATIENT)
Dept: PHYSICAL THERAPY | Age: 52
End: 2017-02-15
Attending: NURSE PRACTITIONER
Payer: COMMERCIAL

## 2017-02-21 ENCOUNTER — HOSPITAL ENCOUNTER (OUTPATIENT)
Dept: PHYSICAL THERAPY | Age: 52
Discharge: HOME | End: 2017-02-21
Attending: NURSE PRACTITIONER
Payer: COMMERCIAL

## 2017-02-21 PROCEDURE — 97140 MANUAL THERAPY 1/> REGIONS: CPT

## 2017-02-21 NOTE — PROGRESS NOTES
Shweta Christianson  : 1965 [facility]  [address], [address] [address],[address], [address] [facility]  [phone]   [phone]          OUTPATIENT PHYSICAL THERAPY:Daily Note 2017    ICD-10: Treatment Diagnosis: Myalgia (M79.1)  Precautions/Allergies:   Adhesive tape-silicones; Codeine; and Percocet [oxycodone-acetaminophen]   Fall Risk Score: 0 (? 5 = High Risk)  MD Orders: Evaluate and treat MEDICAL/REFERRING DIAGNOSIS:  Interstitial cystitis (chronic) without hematuria [N30.10]  Frequency of micturition [R35.0]   DATE OF ONSET:   REFERRING PHYSICIAN: Nam Candelaria, *  RETURN PHYSICIAN APPOINTMENT: unknown     INITIAL ASSESSMENT:  Ms. Alexis Tovar presents with pelvic pain and urinary frequency, both correlating with her dx of IC. She presents with significant myofascial restrictions around pelvis/hips/perineum, most of which refer to right lower quadrant. She was educated on bladder irritants as she has already noticed certain foods have a negative impact on her IC. She was also educated on the detrimental effects of limiting fluid intake on her bladder health. Pt shows good rehab potential working towards reducing bladder irritants, decreasing myofascial tightness in surrounding structures, and reducing resting tone of PFmm (to be assessed next visit but pt's symptoms and subjective intake point to a possible contribution). Pt will benefit from physical therapy to address stated problems. Plan of care was discussed and agreed upon with patient and HEP was initiated. Thank you for the opportunity to work with this patient. PROBLEM LIST (Impacting functional limitations):  1. Decreased Strength  2. Increased Pain  3. Decreased Activity Tolerance  4. Decreased Flexibility/Joint Mobility  5. Decreased Knowledge of Precautions INTERVENTIONS PLANNED:  1. Neuromuscular re-education  2. Biofeedback as needed  3. HEP  4. Bladder retraining  5.  Bladder education  6. Electrical Stimulation  7. Manual Therapy  8. Therapeutic Activites  9. Therapeutic Exercise/Strengthening  10. Ultrasound (US)   TREATMENT PLAN:  Effective Dates: 1/13/17 TO 4/7/17. Frequency/Duration: 1 time a week for 12 weeks  GOALS: (Goals have been discussed and agreed upon with patient.)  Short-Term Functional Goals: Time Frame: 2 weeks  1. Patient will demonstrate independence with home exercise program.  2. Pt will report increase in fluid intake to 8cups/day for improved bladder health  Discharge Goals: Time Frame: 12 weeks  1. Pt will score 19% on PFI7 for overall functional improvement. 2. Pt will report improvement in pain levels to 2/10 on avg to allow for sleeping throughout the night without waking secondary to pain. 3. Pt will report decrease in urinary frequency to every other hour for decreased social anxiety. Rehabilitation Potential For Stated Goals: Good  Regarding [place] therapy, I certify that the treatment plan above will be carried out by a therapist or under their direction. Thank you for this referral,  Massiel Rader, PT     Referring Physician Signature: Margaret Rob, Jeanne Fang, *              Date                    HISTORY:   Present Symptoms:  Pain Intensity 1: 5 02/22/17   Pt reports 5/10 pain today. She states she has been trying the self release with ball on perineum and she feels this helps symptoms somewhat. Feels stress continues but reports improvement in sympotms since starting PT. History of Present Injury/Illness (Reason for Referral):  Pt states pain started in 2011 as a bad UTI. This improved but pain/frequency returned and has not gone away since. She states her dtr is in high school and is going to school online. Sitting a long time bothers. She gets sore at incision site of hysterectomy. Hiking or any activity away from bathroom is not possible at this point. Occasionally lying down does help symptoms.   She states the amitrypteline and states it helps some. Past Medical History/Comorbidities:   Ms. Himanshu Smith  has a past medical history of Anxiety; Arthritis; Autoimmune disease ([address] Chronic pain; GERD (gastroesophageal reflux disease); Hypertension; Interstitial cystitis; Left knee pain; Meniscus tear; Morbid obesity ([address] Other ill-defined conditions(799.89); Other malaise and fatigue; Ovarian cyst; Panic attacks; Seizures ([address] Seizures ([address] Type 2 diabetes mellitus without complication, without long-term current use of insulin ([address] (2/7/2017); Unspecified adverse effect of anesthesia (2014); and Urinary frequency (7/9/2015). She also has no past medical history of Aneurysm ([address] Arrhythmia; Asthma; CAD (coronary artery disease); Cancer ([address] Chronic kidney disease; Chronic obstructive pulmonary disease ([address] Coagulation disorder ([address] Difficult intubation; Heart failure ([address] Liver disease; Malignant hyperthermia due to anesthesia; Nausea & vomiting; Pseudocholinesterase deficiency; PUD (peptic ulcer disease); Stroke [facility]); Thromboembolus ([address] Thyroid disease; or Unspecified sleep apnea. Ms. Himanshu Smith  has a past surgical history that includes cholecystectomy (1994); gyn (1982); hysterectomy (2014); knee arthroscopy; and cardiac surg procedure unlist.  Social History/Living Environment:     lives w dtr and  and going to school online  Prior Level of Function/Work/Activity:  Not currently working  Personal Factors:    Occupation:  Stress of going back to school changing careers  Current Medications:    Current Outpatient Prescriptions:     nitrofurantoin, macrocrystal-monohydrate, (MACROBID) 100 mg capsule, Take 1 Cap by mouth two (2) times a day., Disp: 4 Cap, Rfl: 0    mirabegron ER (MYRBETRIQ) 25 mg ER tablet, Take 1 Tab by mouth daily. , Disp: 30 Tab, Rfl: 1    estradiol (ESTRACE) 2 mg tablet, Take 1 Tab by mouth nightly., Disp: 30 Tab, Rfl: 9    amitriptyline (ELAVIL) 25 mg tablet, Take 1 Tab by mouth nightly., Disp: 30 Tab, Rfl: 2    Mth-Me Blue-Sod Phos-PhSal-Hyo (URIBEL) 118-10-40.8-36 mg cap capsule, Take 1 Cap by mouth four (4) times daily. , Disp: 40 Cap, Rfl: 1    estradiol (ESTRACE) 0.01 % (0.1 mg/gram) vaginal cream, Insert 1 g into vagina three (3) days a week., Disp: , Rfl:     multivitamin (ONE A DAY) tablet, Take 1 Tab by mouth daily. , Disp: , Rfl:     losartan (COZAAR) 100 mg tablet, Take 1 Tab by mouth nightly. Indications: HYPERTENSION, Disp: 30 Tab, Rfl: 11    mometasone (NASONEX) 50 mcg/actuation nasal spray, 2 Sprays by Both Nostrils route daily. , Disp: 1 Container, Rfl: 11    zolpidem (AMBIEN) 10 mg tablet, Take  by mouth nightly as needed for Sleep., Disp: , Rfl:     Cetirizine (ZYRTEC) 10 mg cap, Take  by mouth., Disp: , Rfl:     estradiol (VAGIFEM) 10 mcg tab vaginal tablet, Insert 10 mcg into vagina two (2) times a week., Disp: , Rfl:     Cholecalciferol, Vitamin D3, (VITAMIN D3) 1,000 unit cap, Take  by mouth daily. , Disp: , Rfl:     venlafaxine (EFFEXOR) 100 mg tablet, Take 100 mg by mouth two (2) times a day. Indications: GENERALIZED ANXIETY DISORDER, Disp: , Rfl:    Date Last Reviewed:  02/22/17   Gynecological History:   · Number of pregnancies: 0, vaginal 0, C-sections 0  · Weight gain: na  · Episiotomy: na  Past Urinary Medical History:    · History of UTI, Menopause: frequent UTI's since 2011,  Prior to hysterectomy  · Previous Treatments: antibiotics  Incontinence History:  PROBLEM: YES/NO: COMMENTS:   Loss of urine with coughing YES    Loss of urine with lifting  NO    Loss of urine with exercise, running NO    Loss of urine with strong urge NO    Loss of urine with approaching the bathroom NO    Loss of urine with key in lock NO    Loss of urine as getting to toilet/removing clothing NO    Loss of urine when hearing running water NO    Have difficulty initiating a urine stream NO 'not really.   Maybe a second or 2'   Have difficulty stopping urine stream NO    Have to strain to empty bladder YES    Dribble urine when urinating NO    Dribble urine after emptying bladder NO    Experience pain with urination YES    Experience burning during urination YES    Have blood in urine NO      Voiding Patterns:  Patient voids every 1 hours during the day and 2-3 times during the night. Patient reports that she empties bladder fully. Patient uses pads for bladder protection; she changes pads 1 times per day. Fluid Intake:  Patient drinks 5 cups of fluid per day. She consumes 2 cups of caffeinated beverages per day. Patient does limit fluid intake to control bladder. Bowel Habits:  Patient demonstrates normal bowel habits. Mobility / Self Care: independent  Personal / Social History:  · Sexually active? NO:   · Social activities restricted due to urinary incontinence?  NO:       Number of Personal Factors/Comorbidities that affect the Plan of Care: 1-2: MODERATE COMPLEXITY   EXAMINATION:   External Observation:   · Voluntary Contraction: present  · Voluntary Relaxation: delayed  · Involuntary Contraction: present  · Involuntary Relaxation: delayed  · Perineal Body Assessment: slightly elevated  · Reflex:  NT  · Anal [place]: NT  · Skin Integrity: normal  · Vaginal Vault Size: within normal limits    [place] PERFECT (Power/Endurnace/Repetitions/Fast Twitch/Elevation/Co-contraction/Timing) Scale:   · [place] PERFECT = 3/5///  · Palpation:   Right Left   Bulbocavernosus Tender w wincing Tender w wincing   Ischocavernosus     Superficial Transverse Perineal tender tender   Sphincter Urethrae     Compressor Urethra      Urethra-vaginalis     Deep Transverse Perineium tender tender   Obturator Internus Tender w wincing Tender w wincing   Coccygeus Tender w wincing Tender w wincing   Levator Ani tender tender   Adductor tender tender   Psoas tender tender ·   Myofascial restrictions noted abdominals, iliopsoas with referrals to right lower quadrant  · Significant ttp right iliacus around iliac crest and ASIS as well as bilat psoas to umbilicus level. Body Structures Involved:  1. Nerves  2. Digestive Structures  3. Muscles Body Functions Affected:  1. Mental  2. Sensory/Pain  3. Genitourinary  4. Reproductive  5. Neuromusculoskeletal  6. Digestive Activities and Participation Affected:  1. General Tasks and Demands  2. Mobility  3. Self Care  4. Interpersonal Interactions and Relationships   Number of elements that affect the Plan of Care: 3: MODERATE COMPLEXITY   CLINICAL PRESENTATION:   Presentation: Evolving clinical presentation with changing clinical characteristics: MODERATE COMPLEXITY   CLINICAL DECISION MAKING:   Outcome Measure: Tool Used: Pelvic Floor Impact Questionnaireshort form 7 (PFIQ-7)   Score:  Initial:   · Bladder or Urine: 52.38  · Bowel or Rectum: 0  · Vagina or Pelvis: 52.38 Most Recent: X (Date: -- )  · Bladder or Urine: X  · Bowel or Rectum: X  · Vagina or Pelvis: X   Interpretation of Score: Each of the 7 sections is scored on a scale from 0-3; 0 representing \"Not at all\", 3 representing \"Quite a bit\". The mean value is taken from all the answered items, then multiplied by 100 to obtain the scale score, ranging from 0-100. This process is repeated for each column representing bowel, bladder, and pelvic pain. ? Self Care:     - CURRENT STATUS: CJ - 20%-39% impaired, limited or restricted    - GOAL STATUS: CI - 1%-19% impaired, limited or restricted    - D/C STATUS:  ---------------To be determined---------------     Medical Necessity:   · Patient demonstrates good rehab potential due to higher previous functional level. Reason for Services/Other Comments:  · Patient continues to require skilled intervention due to ongoing goals noted above.    Use of outcome tool(s) and clinical judgement create a POC that gives a: Questionable prediction of patient's progress: MODERATE COMPLEXITY   TREATMENT:   (In addition to Assessment/Re-Assessment sessions the following treatments were rendered)  THERAPEUTIC EXERCISE: ( 0 minutes):  Exercises per grid below to improve strength and coordination. Required minimal verbal and tactile cues to promote proper body mechanics and promote proper body breathing techniques. Progressed resistance and repetitions as indicated. Date:  1/13/17 Date:  1/20/18 Date:  1/27/17   Activity/Exercise Parameters Parameters Parameters   Diaphragmatic breathing practiced     DKC/perineum stretch 45sec x2 reviewed    Piriformis stretch 45sec x2 reviewed reviewed   Adductor stretch 45sec x2 reviewed Standing 45\" x2   Review of bladder health, reduction of irritants  10'    King stretch  45sec x2 45\" x2   Self release sitting on medicine ball   1' x2 and HEP discussion   theracane tpr release   Review for home            Exercises:  Patient instructed in pelvic floor exercises listed below:  2/21/17 - reviewed standing adductor stretch for home  HEP:  All ex's in grid. Sheet issued    Manual Therapy:  (60')  SCS psoas, iliacus, adductors, manual stretching adductors, king str, piriformis. MFR abdominals  TPR/MFR OI, LA, coccygeus  The following educational topics were reviewed with patient:  Bladder health, tips to control urge, bladder diary, pelvic floor anatomy, how foods affect bladder, bladder retraining. Treatment/Session Assessment: Pt voiced pain relief with MT reducing from 5/10 to 2/10. Reviewed adductor stretch for home. Pt to continue with self releases at home. Assess prolonged results. · Pain/ Symptoms: Initial:   5/10 Post Session:  2/10 ·   Compliance with Program/Exercises: Will assess as treatment progresses. · Recommendations/Intent for next treatment session: \"Next visit will focus on advancements to more challenging activities\".   Total Treatment Duration:  PT Patient Time In/Time Out  Time In: 1100  Time Out: 7111 [facility]

## 2017-02-24 ENCOUNTER — HOSPITAL ENCOUNTER (OUTPATIENT)
Dept: SLEEP MEDICINE | Age: 52
Discharge: HOME | End: 2017-02-24
Payer: COMMERCIAL

## 2017-02-24 PROCEDURE — 95811 POLYSOM 6/>YRS CPAP 4/> PARM: CPT

## 2017-02-28 ENCOUNTER — HOSPITAL ENCOUNTER (OUTPATIENT)
Dept: PHYSICAL THERAPY | Age: 52
Discharge: HOME | End: 2017-02-28
Attending: NURSE PRACTITIONER
Payer: COMMERCIAL

## 2017-02-28 PROCEDURE — 97140 MANUAL THERAPY 1/> REGIONS: CPT

## 2017-02-28 NOTE — PROGRESS NOTES
Librado Dodson  : 1965 [facility] at [address], [address] [place] [address],[address], [address]  [phone]   [phone]          OUTPATIENT PHYSICAL THERAPY:Daily Note 2017    ICD-10: Treatment Diagnosis: Myalgia (M79.1)  Precautions/Allergies:   Adhesive tape-silicones; Codeine; and Percocet [oxycodone-acetaminophen]   Fall Risk Score: 0 (? 5 = High Risk)  MD Orders: Evaluate and treat MEDICAL/REFERRING DIAGNOSIS:  Interstitial cystitis (chronic) without hematuria [N30.10]  Frequency of micturition [R35.0]   DATE OF ONSET:   REFERRING PHYSICIAN: Reagan Paula, Rosalene Saint, *  RETURN PHYSICIAN APPOINTMENT: unknown     INITIAL ASSESSMENT:  Ms. Himanshu Smith presents with pelvic pain and urinary frequency, both correlating with her dx of IC. She presents with significant myofascial restrictions around pelvis/hips/perineum, most of which refer to right lower quadrant. She was educated on bladder irritants as she has already noticed certain foods have a negative impact on her IC. She was also educated on the detrimental effects of limiting fluid intake on her bladder health. Pt shows good rehab potential working towards reducing bladder irritants, decreasing myofascial tightness in surrounding structures, and reducing resting tone of PFmm (to be assessed next visit but pt's symptoms and subjective intake point to a possible contribution). Pt will benefit from physical therapy to address stated problems. Plan of care was discussed and agreed upon with patient and HEP was initiated. Thank you for the opportunity to work with this patient. PROBLEM LIST (Impacting functional limitations):  1. Decreased Strength  2. Increased Pain  3. Decreased Activity Tolerance  4. Decreased Flexibility/Joint Mobility  5. Decreased Knowledge of Precautions INTERVENTIONS PLANNED:  1. Neuromuscular re-education  2. Biofeedback as needed  3. HEP  4. Bladder retraining  5.  Bladder education  6. Electrical Stimulation  7. Manual Therapy  8. Therapeutic Activites  9. Therapeutic Exercise/Strengthening  10. Ultrasound (US)   TREATMENT PLAN:  Effective Dates: 1/13/17 TO 4/7/17. Frequency/Duration: 1 time a week for 12 weeks  GOALS: (Goals have been discussed and agreed upon with patient.)  Short-Term Functional Goals: Time Frame: 2 weeks  1. Patient will demonstrate independence with home exercise program.  2. Pt will report increase in fluid intake to 8cups/day for improved bladder health  Discharge Goals: Time Frame: 12 weeks  1. Pt will score 19% on PFI7 for overall functional improvement. 2. Pt will report improvement in pain levels to 2/10 on avg to allow for sleeping throughout the night without waking secondary to pain. 3. Pt will report decrease in urinary frequency to every other hour for decreased social anxiety. Rehabilitation Potential For Stated Goals: Good  Regarding Cornel Calderon therapy, I certify that the treatment plan above will be carried out by a therapist or under their direction. Thank you for this referral,  Cynthia Alvarado, PT     Referring Physician Signature: Andria Healy, Oseas Pryor, *              Date                    HISTORY:   Present Symptoms:  Pain Intensity 1: 3 02/28/17   Pt states her adductors have been sore; no other new c/o. Reports 3/10 pain mostly in lower abdomen region. History of Present Injury/Illness (Reason for Referral):  Pt states pain started in 2011 as a bad UTI. This improved but pain/frequency returned and has not gone away since. She states her dtr is in high school and is going to school online. Sitting a long time bothers. She gets sore at incision site of hysterectomy. Hiking or any activity away from bathroom is not possible at this point. Occasionally lying down does help symptoms. She states the amitrypteline and states it helps some.     Past Medical History/Comorbidities:   Ms. Merlene Donahue  has a past medical history of Anxiety; Arthritis; Autoimmune disease ([facility] Chronic pain; GERD (gastroesophageal reflux disease); Hypertension; Interstitial cystitis; Left knee pain; Meniscus tear; Morbid obesity ([address] Other ill-defined conditions(799.89); Other malaise and fatigue; Ovarian cyst; Panic attacks; Seizures ([address] Seizures ([facility] [facility] Type 2 diabetes mellitus without complication, without long-term current use of insulin ([facility] [facility] (2/7/2017); Unspecified adverse effect of anesthesia (2014); and Urinary frequency (7/9/2015). She also has no past medical history of Aneurysm ([facility] Arrhythmia; Asthma; CAD (coronary artery disease); Cancer ([facility] Chronic kidney disease; Chronic obstructive pulmonary disease ([facility] [facility] Coagulation disorder ([facility] Difficult intubation; Heart failure ([facility] Liver disease; Malignant hyperthermia due to anesthesia; Nausea & vomiting; Pseudocholinesterase deficiency; PUD (peptic ulcer disease); Stroke [facility]); Thromboembolus ([facility] Thyroid disease; or Unspecified sleep apnea. Ms. Latesha Holland  has a past surgical history that includes cholecystectomy (1994); gyn (1982); hysterectomy (2014); knee arthroscopy; and cardiac surg procedure unlist.  Social History/Living Environment:     lives w dtr and  and going to school online  Prior Level of Function/Work/Activity:  Not currently working  Personal Factors:    Occupation:  Stress of going back to school changing careers  Current Medications:    Current Outpatient Prescriptions:     nitrofurantoin, macrocrystal-monohydrate, (MACROBID) 100 mg capsule, Take 1 Cap by mouth two (2) times a day., Disp: 4 Cap, Rfl: 0    mirabegron ER (MYRBETRIQ) 25 mg ER tablet, Take 1 Tab by mouth daily. , Disp: 30 Tab, Rfl: 1    estradiol (ESTRACE) 2 mg tablet, Take 1 Tab by mouth nightly., Disp: 30 Tab, Rfl: 9    amitriptyline (ELAVIL) 25 mg tablet, Take 1 Tab by mouth nightly., Disp: 30 Tab, Rfl: 2    Mth-Me Blue-Sod Phos-PhSal-Hyo (URIBEL) 118-10-40.8-36 mg cap capsule, Take 1 Cap by mouth four (4) times daily. , Disp: 40 Cap, Rfl: 1    estradiol (ESTRACE) 0.01 % (0.1 mg/gram) vaginal cream, Insert 1 g into vagina three (3) days a week., Disp: , Rfl:     multivitamin (ONE A DAY) tablet, Take 1 Tab by mouth daily. , Disp: , Rfl:     losartan (COZAAR) 100 mg tablet, Take 1 Tab by mouth nightly. Indications: HYPERTENSION, Disp: 30 Tab, Rfl: 11    mometasone (NASONEX) 50 mcg/actuation nasal spray, 2 Sprays by Both Nostrils route daily. , Disp: 1 Container, Rfl: 11    zolpidem (AMBIEN) 10 mg tablet, Take  by mouth nightly as needed for Sleep., Disp: , Rfl:     Cetirizine (ZYRTEC) 10 mg cap, Take  by mouth., Disp: , Rfl:     estradiol (VAGIFEM) 10 mcg tab vaginal tablet, Insert 10 mcg into vagina two (2) times a week., Disp: , Rfl:     Cholecalciferol, Vitamin D3, (VITAMIN D3) 1,000 unit cap, Take  by mouth daily. , Disp: , Rfl:     venlafaxine (EFFEXOR) 100 mg tablet, Take 100 mg by mouth two (2) times a day. Indications: GENERALIZED ANXIETY DISORDER, Disp: , Rfl:    Date Last Reviewed:  02/28/17   Gynecological History:   · Number of pregnancies: 0, vaginal 0, C-sections 0  · Weight gain: na  · Episiotomy: na  Past Urinary Medical History:    · History of UTI, Menopause: frequent UTI's since 2011,  Prior to hysterectomy  · Previous Treatments: antibiotics  Incontinence History:  PROBLEM: YES/NO: COMMENTS:   Loss of urine with coughing YES    Loss of urine with lifting  NO    Loss of urine with exercise, running NO    Loss of urine with strong urge NO    Loss of urine with approaching the bathroom NO    Loss of urine with key in lock NO    Loss of urine as getting to toilet/removing clothing NO    Loss of urine when hearing running water NO    Have difficulty initiating a urine stream NO 'not really.   Maybe a second or 2'   Have difficulty stopping urine stream NO    Have to strain to empty bladder YES    Dribble urine when urinating NO    Dribble urine after emptying bladder NO    Experience pain with urination YES    Experience burning during urination YES    Have blood in urine NO      Voiding Patterns:  Patient voids every 1 hours during the day and 2-3 times during the night. Patient reports that she empties bladder fully. Patient uses pads for bladder protection; she changes pads 1 times per day. Fluid Intake:  Patient drinks 5 cups of fluid per day. She consumes 2 cups of caffeinated beverages per day. Patient does limit fluid intake to control bladder. Bowel Habits:  Patient demonstrates normal bowel habits. Mobility / Self Care: independent  Personal / Social History:  · Sexually active? NO:   · Social activities restricted due to urinary incontinence? NO:       Number of Personal Factors/Comorbidities that affect the Plan of Care: 1-2: MODERATE COMPLEXITY   EXAMINATION:   External Observation:   · Voluntary Contraction: present  · Voluntary Relaxation: delayed  · Involuntary Contraction: present  · Involuntary Relaxation: delayed  · Perineal Body Assessment: slightly elevated  · Reflex:  NT  · Anal [employer]: NT  · Skin Integrity: normal  · Vaginal Vault Size: within normal limits    [place] PERFECT (Power/Endurnace/Repetitions/Fast Twitch/Elevation/Co-contraction/Timing) Scale:   · [place] PERFECT = 3/5///  · Palpation:   Right Left   Bulbocavernosus Tender w wincing Tender w wincing   Ischocavernosus     Superficial Transverse Perineal tender tender   Sphincter Urethrae     Compressor Urethra      Urethra-vaginalis     Deep Transverse Perineium tender tender   Obturator Internus Tender w wincing Tender w wincing   Coccygeus Tender w wincing Tender w wincing   Levator Ani tender tender   Adductor tender tender   Psoas tender tender ·   Myofascial restrictions noted abdominals, iliopsoas with referrals to right lower quadrant  · Significant ttp right iliacus around iliac crest and ASIS as well as bilat psoas to umbilicus level. Body Structures Involved:  1. Nerves  2.  Digestive Structures  3. Muscles Body Functions Affected:  1. Mental  2. Sensory/Pain  3. Genitourinary  4. Reproductive  5. Neuromusculoskeletal  6. Digestive Activities and Participation Affected:  1. General Tasks and Demands  2. Mobility  3. Self Care  4. Interpersonal Interactions and Relationships   Number of elements that affect the Plan of Care: 3: MODERATE COMPLEXITY   CLINICAL PRESENTATION:   Presentation: Evolving clinical presentation with changing clinical characteristics: MODERATE COMPLEXITY   CLINICAL DECISION MAKING:   Outcome Measure: Tool Used: Pelvic Floor Impact Questionnaireshort form 7 (PFIQ-7)   Score:  Initial:   · Bladder or Urine: 52.38  · Bowel or Rectum: 0  · Vagina or Pelvis: 52.38 Most Recent: X (Date: -- )  · Bladder or Urine: X  · Bowel or Rectum: X  · Vagina or Pelvis: X   Interpretation of Score: Each of the 7 sections is scored on a scale from 0-3; 0 representing \"Not at all\", 3 representing \"Quite a bit\". The mean value is taken from all the answered items, then multiplied by 100 to obtain the scale score, ranging from 0-100. This process is repeated for each column representing bowel, bladder, and pelvic pain. ? Self Care:     - CURRENT STATUS: CJ - 20%-39% impaired, limited or restricted    - GOAL STATUS: CI - 1%-19% impaired, limited or restricted    - D/C STATUS:  ---------------To be determined---------------     Medical Necessity:   · Patient demonstrates good rehab potential due to higher previous functional level. Reason for Services/Other Comments:  · Patient continues to require skilled intervention due to ongoing goals noted above.    Use of outcome tool(s) and clinical judgement create a POC that gives a: Questionable prediction of patient's progress: MODERATE COMPLEXITY   TREATMENT:   (In addition to Assessment/Re-Assessment sessions the following treatments were rendered)  THERAPEUTIC EXERCISE: ( 0 minutes):  Exercises per grid below to improve strength and coordination. Required minimal verbal and tactile cues to promote proper body mechanics and promote proper body breathing techniques. Progressed resistance and repetitions as indicated. Date:  1/13/17 Date:  1/20/18 Date:  1/27/17   Activity/Exercise Parameters Parameters Parameters   Diaphragmatic breathing practiced     DKC/perineum stretch 45sec x2 reviewed    Piriformis stretch 45sec x2 reviewed reviewed   Adductor stretch 45sec x2 reviewed Standing 45\" x2   Review of bladder health, reduction of irritants  10'    King stretch  45sec x2 45\" x2   Self release sitting on medicine ball   1' x2 and HEP discussion   theracane tpr release   Review for home            Exercises:  Patient instructed in pelvic floor exercises listed below:  2/21/17 - reviewed standing adductor stretch for home  HEP:  All ex's in grid. Sheet issued    Manual Therapy:  (50')  SCS psoas, iliacus, adductors, manual stretching adductors, king str, piriformis. MFR abdominals  TPR/MFR OI, LA, coccygeus  The following educational topics were reviewed with patient:  Bladder health, tips to control urge, bladder diary, pelvic floor anatomy, how foods affect bladder, bladder retraining. Treatment/Session Assessment: Pt voiced symptom relief end of session. Decreased referral to lower right quadrant noted during MT  · Pain/ Symptoms: Initial:   3/10 Post Session:  0-1/10 ·   Compliance with Program/Exercises: Will assess as treatment progresses. · Recommendations/Intent for next treatment session: \"Next visit will focus on advancements to more challenging activities\".   Total Treatment Duration:  PT Patient Time In/Time Out  Time In: 1430  Time Out: 1530    Sid Griffin, PT

## 2017-03-07 ENCOUNTER — HOSPITAL ENCOUNTER (OUTPATIENT)
Dept: PHYSICAL THERAPY | Age: 52
Discharge: HOME | End: 2017-03-07
Attending: NURSE PRACTITIONER
Payer: COMMERCIAL

## 2017-03-07 PROCEDURE — 97140 MANUAL THERAPY 1/> REGIONS: CPT

## 2017-03-07 PROCEDURE — 97530 THERAPEUTIC ACTIVITIES: CPT

## 2017-03-07 NOTE — PROGRESS NOTES
Randy Chung  : 1965 [facility]  [address], [address] [address],[address], [address]  [phone]   [phone]          OUTPATIENT PHYSICAL THERAPY:Daily Note and Progress Report 3/7/2017    ICD-10: Treatment Diagnosis: Myalgia (M79.1)  Precautions/Allergies:   Adhesive tape-silicones; Codeine; and Percocet [oxycodone-acetaminophen]   Fall Risk Score: 0 (? 5 = High Risk)  MD Orders: Evaluate and treat MEDICAL/REFERRING DIAGNOSIS:  Interstitial cystitis (chronic) without hematuria [N30.10]  Frequency of micturition [R35.0]   DATE OF ONSET:   REFERRING PHYSICIAN: Marcina Leventhal, Nanda Pallas, *  RETURN PHYSICIAN APPOINTMENT: unknown     INITIAL ASSESSMENT:  Ms. Rachel Pratt presents with pelvic pain and urinary frequency, both correlating with her dx of IC. She presents with significant myofascial restrictions around pelvis/hips/perineum, most of which refer to right lower quadrant. She was educated on bladder irritants as she has already noticed certain foods have a negative impact on her IC. She was also educated on the detrimental effects of limiting fluid intake on her bladder health. Pt shows good rehab potential working towards reducing bladder irritants, decreasing myofascial tightness in surrounding structures, and reducing resting tone of PFmm (to be assessed next visit but pt's symptoms and subjective intake point to a possible contribution). Pt will benefit from physical therapy to address stated problems. Plan of care was discussed and agreed upon with patient and HEP was initiated. Thank you for the opportunity to work with this patient. PROBLEM LIST (Impacting functional limitations):  1. Decreased Strength  2. Increased Pain  3. Decreased Activity Tolerance  4. Decreased Flexibility/Joint Mobility  5. Decreased Knowledge of Precautions INTERVENTIONS PLANNED:  1. Neuromuscular re-education  2. Biofeedback as needed  3.  HEP  4. Bladder retraining  5. Bladder education  6. Electrical Stimulation  7. Manual Therapy  8. Therapeutic Activites  9. Therapeutic Exercise/Strengthening  10. Ultrasound (US)   TREATMENT PLAN:  Effective Dates: 1/13/17 TO 4/7/17. Frequency/Duration: 1 time a week for 12 weeks  GOALS: (Goals have been discussed and agreed upon with patient.)  Short-Term Functional Goals: Time Frame: 2 weeks  1. Patient will demonstrate independence with home exercise program.  GOAL MET 3/7/17  2. Pt will report increase in fluid intake to 8cups/day for improved bladder health. IMPROVED BUT NOT CONSISTENT 3/7/17  Discharge Goals: Time Frame: 12 weeks  1. Pt will score 19% on PFI7 for overall functional improvement. ONGOING GOAL.  3/7/17 (34%)  2. Pt will report improvement in pain levels to 2/10 on avg to allow for sleeping throughout the night without waking secondary to pain. OVERALL IMPROVED BUT ONGOING. 3/7/17  3. Pt will report decrease in urinary frequency to every other hour for decreased social anxiety. IMPROVED FROM 1 HR TO 1.5 HR BUT ONGOING 3/7/17  Rehabilitation Potential For Stated Goals: Good  Regarding [employer] therapy, I certify that the treatment plan above will be carried out by a therapist or under their direction. Thank you for this referral,  Radha Maldonado, PT     Referring Physician Signature: Garfield Khanna, *              Date                    HISTORY:   Present Symptoms:  Pain Intensity 1: 0 03/07/17   Pt states discomfort has fluctuated a lot this week. Starting a new c-pap machine lately. History of Present Injury/Illness (Reason for Referral):  Pt states pain started in 2011 as a bad UTI. This improved but pain/frequency returned and has not gone away since. She states her dtr is in high school and is going to school online. Sitting a long time bothers. She gets sore at incision site of hysterectomy. Hiking or any activity away from bathroom is not possible at this point. Occasionally lying down does help symptoms. She states the amitrypteline and states it helps some. Past Medical History/Comorbidities:   Ms. Nolan Lopez  has a past medical history of Anxiety; Arthritis; Autoimmune disease ([address] Chronic pain; GERD (gastroesophageal reflux disease); Hypertension; Interstitial cystitis; Left knee pain; Meniscus tear; Morbid obesity ([address] Other ill-defined conditions(799.89); Other malaise and fatigue; Ovarian cyst; Panic attacks; Seizures ([address] Seizures ([address] Type 2 diabetes mellitus without complication, without long-term current use of insulin ([address] (2/7/2017); Unspecified adverse effect of anesthesia (2014); and Urinary frequency (7/9/2015). She also has no past medical history of Aneurysm ([address] Arrhythmia; Asthma; CAD (coronary artery disease); Cancer ([address] Chronic kidney disease; Chronic obstructive pulmonary disease ([address] Coagulation disorder ([address] Difficult intubation; Heart failure ([address] Liver disease; Malignant hyperthermia due to anesthesia; Nausea & vomiting; Pseudocholinesterase deficiency; PUD (peptic ulcer disease); Stroke [facility]); Thromboembolus ([address] Thyroid disease; or Unspecified sleep apnea. Ms. Nolan Lopez  has a past surgical history that includes cholecystectomy (1994); gyn (1982); hysterectomy (2014); knee arthroscopy; and cardiac surg procedure unlist.  Social History/Living Environment:     lives w dtr and  and going to school online  Prior Level of Function/Work/Activity:  Not currently working  Personal Factors:    Occupation:  Stress of going back to school changing careers  Current Medications:    Current Outpatient Prescriptions:     zolpidem (AMBIEN) 10 mg tablet, TAKE 1 TABLET BY MOUTH AT BEDTIME AS DIRECTED, Disp: 30 Tab, Rfl: 5    Brompheniramine-Pseudoeph-DM (BROMFED DM) 2-30-10 mg/5 mL syrup, Take 5 mL by mouth four (4) times daily as needed for up to 10 days. , Disp: 200 mL, Rfl: 0    amoxicillin-clavulanate (AUGMENTIN) 875-125 mg per tablet, Take 1 Tab by mouth every twelve (12) hours for 10 days. , Disp: 20 Tab, Rfl: 0    amitriptyline (ELAVIL) 25 mg tablet, Take 1 Tab by mouth nightly., Disp: 30 Tab, Rfl: 2    nitrofurantoin, macrocrystal-monohydrate, (MACROBID) 100 mg capsule, Take 1 Cap by mouth two (2) times a day., Disp: 4 Cap, Rfl: 0    mirabegron ER (MYRBETRIQ) 25 mg ER tablet, Take 1 Tab by mouth daily. , Disp: 30 Tab, Rfl: 1    estradiol (ESTRACE) 2 mg tablet, Take 1 Tab by mouth nightly., Disp: 30 Tab, Rfl: 9    Mth-Me Blue-Sod Phos-PhSal-Hyo (URIBEL) 118-10-40.8-36 mg cap capsule, Take 1 Cap by mouth four (4) times daily. , Disp: 40 Cap, Rfl: 1    estradiol (ESTRACE) 0.01 % (0.1 mg/gram) vaginal cream, Insert 1 g into vagina three (3) days a week., Disp: , Rfl:     multivitamin (ONE A DAY) tablet, Take 1 Tab by mouth daily. , Disp: , Rfl:     losartan (COZAAR) 100 mg tablet, Take 1 Tab by mouth nightly. Indications: HYPERTENSION, Disp: 30 Tab, Rfl: 11    mometasone (NASONEX) 50 mcg/actuation nasal spray, 2 Sprays by Both Nostrils route daily. , Disp: 1 Container, Rfl: 11    Cetirizine (ZYRTEC) 10 mg cap, Take  by mouth., Disp: , Rfl:     estradiol (VAGIFEM) 10 mcg tab vaginal tablet, Insert 10 mcg into vagina two (2) times a week., Disp: , Rfl:     Cholecalciferol, Vitamin D3, (VITAMIN D3) 1,000 unit cap, Take  by mouth daily. , Disp: , Rfl:     venlafaxine (EFFEXOR) 100 mg tablet, Take 100 mg by mouth two (2) times a day.  Indications: GENERALIZED ANXIETY DISORDER, Disp: , Rfl:    Date Last Reviewed:  03/07/17   Gynecological History:   · Number of pregnancies: 0, vaginal 0, C-sections 0  · Weight gain: na  · Episiotomy: na  Past Urinary Medical History:    · History of UTI, Menopause: frequent UTI's since 2011,  Prior to hysterectomy  · Previous Treatments: antibiotics  Incontinence History:  PROBLEM: YES/NO: COMMENTS:   Loss of urine with coughing YES    Loss of urine with lifting  NO    Loss of urine with exercise, running NO    Loss of urine with strong urge NO    Loss of urine with approaching the bathroom NO    Loss of urine with key in lock NO    Loss of urine as getting to toilet/removing clothing NO    Loss of urine when hearing running water NO    Have difficulty initiating a urine stream NO 'not really. Maybe a second or 2'   Have difficulty stopping urine stream NO    Have to strain to empty bladder YES    Dribble urine when urinating NO    Dribble urine after emptying bladder NO    Experience pain with urination YES    Experience burning during urination YES    Have blood in urine NO      Voiding Patterns:  Patient voids every 1 hours during the day and 2-3 times during the night. Patient reports that she empties bladder fully. Patient uses pads for bladder protection; she changes pads 1 times per day. Fluid Intake:  Patient drinks 5 cups of fluid per day. She consumes 2 cups of caffeinated beverages per day. Patient does limit fluid intake to control bladder. Bowel Habits:  Patient demonstrates normal bowel habits. Mobility / Self Care: independent  Personal / Social History:  · Sexually active? NO:   · Social activities restricted due to urinary incontinence?  NO:       Number of Personal Factors/Comorbidities that affect the Plan of Care: 1-2: MODERATE COMPLEXITY   EXAMINATION:   External Observation:   · Voluntary Contraction: present  · Voluntary Relaxation: delayed  · Involuntary Contraction: present  · Involuntary Relaxation: delayed  · Perineal Body Assessment: slightly elevated  · Reflex:  NT  · Anal [place]: NT  · Skin Integrity: normal  · Vaginal Vault Size: within normal limits    [place] PERFECT (Power/Endurnace/Repetitions/Fast Twitch/Elevation/Co-contraction/Timing) Scale:   · [place] PERFECT = 3/5///  · Palpation:   Right Left   Bulbocavernosus Tender w wincing Tender w wincing   Ischocavernosus     Superficial Transverse Perineal tender tender   Sphincter Urethrae     Compressor Urethra Urethra-vaginalis     Deep Transverse Perineium tender tender   Obturator Internus Tender w wincing Tender w wincing   Coccygeus Tender w wincing Tender w wincing   Levator Ani tender tender   Adductor tender tender   Psoas tender tender ·   Myofascial restrictions noted abdominals, iliopsoas with referrals to right lower quadrant  · Significant ttp right iliacus around iliac crest and ASIS as well as bilat psoas to umbilicus level. Body Structures Involved:  1. Nerves  2. Digestive Structures  3. Muscles Body Functions Affected:  1. Mental  2. Sensory/Pain  3. Genitourinary  4. Reproductive  5. Neuromusculoskeletal  6. Digestive Activities and Participation Affected:  1. General Tasks and Demands  2. Mobility  3. Self Care  4. Interpersonal Interactions and Relationships   Number of elements that affect the Plan of Care: 3: MODERATE COMPLEXITY   CLINICAL PRESENTATION:   Presentation: Evolving clinical presentation with changing clinical characteristics: MODERATE COMPLEXITY   CLINICAL DECISION MAKING:   Outcome Measure: Tool Used: Pelvic Floor Impact Questionnaireshort form 7 (PFIQ-7)   Score:  Initial:   · Bladder or Urine: 52.38  · Bowel or Rectum: 0  · Vagina or Pelvis: 52.38 Most Recent:  (Date: 3/7/17 )  · Bladder or Urine: 52.38  · Bowel or Rectum: X  · Vagina or Pelvis: 52.38   Interpretation of Score: Each of the 7 sections is scored on a scale from 0-3; 0 representing \"Not at all\", 3 representing \"Quite a bit\". The mean value is taken from all the answered items, then multiplied by 100 to obtain the scale score, ranging from 0-100. This process is repeated for each column representing bowel, bladder, and pelvic pain. ?  Self Care:     - CURRENT STATUS: CJ - 20%-39% impaired, limited or restricted    - GOAL STATUS: CI - 1%-19% impaired, limited or restricted    - D/C STATUS:  ---------------To be determined---------------     Medical Necessity:   · Patient demonstrates good rehab potential due to higher previous functional level. Reason for Services/Other Comments:  · Patient continues to require skilled intervention due to ongoing goals noted above. Use of outcome tool(s) and clinical judgement create a POC that gives a: Questionable prediction of patient's progress: MODERATE COMPLEXITY   TREATMENT:   (In addition to Assessment/Re-Assessment sessions the following treatments were rendered)  THERAPEUTIC EXERCISE: ( 0 minutes):  Exercises per grid below to improve strength and coordination. Required minimal verbal and tactile cues to promote proper body mechanics and promote proper body breathing techniques. Progressed resistance and repetitions as indicated. Date:  1/13/17 Date:  1/20/18 Date:  1/27/17    Activity/Exercise Parameters Parameters Parameters    Diaphragmatic breathing practiced      DKC/perineum stretch 45sec x2 reviewed     Piriformis stretch 45sec x2 reviewed reviewed    Adductor stretch 45sec x2 reviewed Standing 45\" x2    Review of bladder health, reduction of irritants  10'     King stretch  45sec x2 45\" x2    Self release sitting on medicine ball   1' x2 and HEP discussion    theracane tpr release   Review for home              Exercises:  Patient instructed in pelvic floor exercises listed below:  2/21/17 - reviewed standing adductor stretch for home  HEP:  All ex's in grid. Sheet issued  Therapeutic Activity (20') - Pt ed bladder retraining and suppression techniques, effects of diet on pelvic pain; elimination diet  Manual Therapy:  (40')  SCS psoas, iliacus, MFR abdominals  SCS adductors, manual stretching adductors - held   [employer], piriformis - held    TPR/MFR OI, LA, coccygeus  The following educational topics were reviewed with patient:  Bladder health, tips to control urge, bladder diary, pelvic floor anatomy, how foods affect bladder, bladder retraining. Treatment/Session Assessment: Pt responded well to tx voicing feeling good after treatment. Increased tenderness OI today. · Pain/ Symptoms: Initial:   0/10 Post Session:  0/10 ·   Compliance with Program/Exercises: Will assess as treatment progresses. · Recommendations/Intent for next treatment session: \"Next visit will focus on advancements to more challenging activities\".   Total Treatment Duration:  PT Patient Time In/Time Out  Time In: 0930  Time Out: 1030    [place], [place]

## 2017-03-21 ENCOUNTER — APPOINTMENT (OUTPATIENT)
Dept: PHYSICAL THERAPY | Age: 52
End: 2017-03-21
Attending: NURSE PRACTITIONER
Payer: COMMERCIAL

## 2017-03-28 ENCOUNTER — APPOINTMENT (OUTPATIENT)
Dept: PHYSICAL THERAPY | Age: 52
End: 2017-03-28
Attending: NURSE PRACTITIONER
Payer: COMMERCIAL

## 2017-04-07 ENCOUNTER — HOSPITAL ENCOUNTER (OUTPATIENT)
Dept: PHYSICAL THERAPY | Age: 52
End: 2017-04-07
Attending: NURSE PRACTITIONER

## 2017-04-14 ENCOUNTER — HOSPITAL ENCOUNTER (OUTPATIENT)
Dept: PHYSICAL THERAPY | Age: 52
Discharge: HOME | End: 2017-04-14
Attending: NURSE PRACTITIONER

## 2017-04-28 ENCOUNTER — APPOINTMENT (OUTPATIENT)
Dept: PHYSICAL THERAPY | Age: 52
End: 2017-04-28
Attending: NURSE PRACTITIONER

## 2017-07-18 PROBLEM — G47.34 NOCTURNAL HYPOXEMIA: Status: ACTIVE | Noted: 2017-07-18

## 2017-12-29 PROBLEM — E66.01 OBESITY, MORBID (HCC): Status: ACTIVE | Noted: 2017-12-29

## 2018-02-06 ENCOUNTER — HOSPITAL ENCOUNTER (OUTPATIENT)
Dept: MAMMOGRAPHY | Age: 53
Discharge: HOME | End: 2018-02-06
Attending: NURSE PRACTITIONER
Payer: COMMERCIAL

## 2018-02-06 DIAGNOSIS — Z12.31 VISIT FOR SCREENING MAMMOGRAM: ICD-10-CM

## 2018-02-06 PROCEDURE — 77067 SCR MAMMO BI INCL CAD: CPT

## 2019-03-22 ENCOUNTER — HOSPITAL ENCOUNTER (OUTPATIENT)
Dept: ULTRASOUND IMAGING | Age: 54
Discharge: HOME | End: 2019-03-22
Attending: NURSE PRACTITIONER
Payer: COMMERCIAL

## 2019-03-22 DIAGNOSIS — R22.2 ABDOMINAL WALL LUMP: ICD-10-CM

## 2019-03-22 PROCEDURE — 76705 ECHO EXAM OF ABDOMEN: CPT

## 2020-03-18 PROBLEM — Z95.1 S/P CABG (CORONARY ARTERY BYPASS GRAFT): Status: ACTIVE | Noted: 2020-03-18

## 2020-03-18 PROBLEM — Z86.73 H/O ISCHEMIC RIGHT MCA STROKE: Status: ACTIVE | Noted: 2020-03-18

## 2020-08-03 NOTE — PROGRESS NOTES
[facility] [place] [address]  [address] [facility], [address] [place] [address], [address]  Phone: [phone]   Fax: [phone]    Pt cancelled today's physical therapy appointment. Plan to follow up at next scheduled visit.     Nena Levin, MPT
No

## 2021-06-17 ENCOUNTER — HOSPITAL ENCOUNTER (OUTPATIENT)
Dept: MAMMOGRAPHY | Age: 56
Discharge: HOME | End: 2021-06-17
Attending: OBSTETRICS & GYNECOLOGY
Payer: COMMERCIAL

## 2021-06-17 DIAGNOSIS — Z12.31 SCREENING MAMMOGRAM, ENCOUNTER FOR: ICD-10-CM

## 2021-06-17 PROCEDURE — 77063 BREAST TOMOSYNTHESIS BI: CPT

## 2021-06-23 ENCOUNTER — HOSPITAL ENCOUNTER (OUTPATIENT)
Dept: MAMMOGRAPHY | Age: 56
Discharge: HOME | End: 2021-06-23
Attending: OBSTETRICS & GYNECOLOGY
Payer: COMMERCIAL

## 2021-06-23 DIAGNOSIS — R92.8 ABNORMAL SCREENING MAMMOGRAM: ICD-10-CM

## 2021-06-23 PROCEDURE — 77065 DX MAMMO INCL CAD UNI: CPT

## 2022-03-18 PROBLEM — G47.34 NOCTURNAL HYPOXEMIA: Status: ACTIVE | Noted: 2017-07-18

## 2022-03-18 PROBLEM — E11.9 TYPE 2 DIABETES MELLITUS WITHOUT COMPLICATION, WITHOUT LONG-TERM CURRENT USE OF INSULIN (HCC): Status: ACTIVE | Noted: 2017-02-07

## 2022-03-19 PROBLEM — E66.01 OBESITY, MORBID (HCC): Status: ACTIVE | Noted: 2017-12-29

## 2022-03-19 PROBLEM — Z95.1 S/P CABG (CORONARY ARTERY BYPASS GRAFT): Status: ACTIVE | Noted: 2020-03-18

## 2022-03-19 PROBLEM — Z86.73 H/O ISCHEMIC RIGHT MCA STROKE: Status: ACTIVE | Noted: 2020-03-18
